# Patient Record
Sex: MALE | Race: WHITE | NOT HISPANIC OR LATINO | Employment: FULL TIME | URBAN - METROPOLITAN AREA
[De-identification: names, ages, dates, MRNs, and addresses within clinical notes are randomized per-mention and may not be internally consistent; named-entity substitution may affect disease eponyms.]

---

## 2023-12-20 ENCOUNTER — OFFICE VISIT (OUTPATIENT)
Dept: ORTHOPEDIC SURGERY | Facility: CLINIC | Age: 33
End: 2023-12-20
Payer: COMMERCIAL

## 2023-12-20 VITALS — BODY MASS INDEX: 26.38 KG/M2 | HEIGHT: 73 IN

## 2023-12-20 DIAGNOSIS — S82.839A NONDISPLACED FRACTURE OF PROXIMAL END OF FIBULA: Primary | ICD-10-CM

## 2023-12-20 DIAGNOSIS — S93.402A MILD ANKLE SPRAIN, LEFT, INITIAL ENCOUNTER: ICD-10-CM

## 2023-12-20 PROCEDURE — 99203 OFFICE O/P NEW LOW 30 MIN: CPT

## 2023-12-20 PROCEDURE — 27780 TREATMENT OF FIBULA FRACTURE: CPT

## 2023-12-20 PROCEDURE — L4361 PNEUMA/VAC WALK BOOT PRE OTS: HCPCS

## 2023-12-20 RX ORDER — IBUPROFEN 800 MG/1
TABLET ORAL
COMMUNITY
Start: 2023-12-17 | End: 2024-01-04 | Stop reason: ALTCHOICE

## 2023-12-20 NOTE — PROGRESS NOTES
HPI  Yan Dove is a 33 y.o. male  in office today for   Chief Complaint   Patient presents with    Left Ankle - Edema, Pain     12/16/23 twisted his ankle and heard it pop   .  he did this four days ago playing softball.  Went to urgent care the next day, diagnosed with a proximal fibula fracture, arrived wearing a CAM boot, using crutches.  He states that the pain and swelling have improved over the last 2 days.  Not having any pain proximal in the area of the fracture, pain mainly lateral side of the ankle.  He has been icing, elevating, ibuprofen as needed.    Past Medical History: history of right ankle tendon injury    Medication  Current Outpatient Medications on File Prior to Visit   Medication Sig Dispense Refill    ibuprofen 800 mg tablet        No current facility-administered medications on file prior to visit.       Physical Exam  Constitutional: well developed, well nourished male in no acute distress  Psychiatric: normal mood, appropriate affect  Eyes: sclera anicteric  HENT: normocephalic/atraumatic  CV: regular rate and rhythm   Respiratory: non labored breathing  Integumentary: no rash  Neurological: moves all extremities    Left Ankle Exam     Tenderness   The patient is experiencing tenderness in the lateral malleolus, CF and ATF.   Swelling: moderate    Range of Motion   Dorsiflexion:  abnormal   Plantar flexion:  abnormal   Eversion:  abnormal   Inversion:  abnormal     Tests   Anterior drawer: negative  Varus tilt: negative    Other   Erythema: absent  Scars: absent  Sensation: normal    Comments:  Ecchymosis diffuse about the ankle and foot into toes.              Imaging/Lab:  X-rays were taken at outside provider which were reviewed by myself via disc and report provided and read by radiology and show soft tissue swelling at the ankle, no fracture at the ankle.  Nondisplaced proximal fibula fracture.      Assessment  Assessment: Left ankle sprain, left proximal fibula  fracture    Plan  Plan:  History, physical exam, and imaging were reviewed with patient. Replaced CAM boot as the one he was given at urgent care is too small with toes hanging over the edge.  Boot should be worn full time other than for hygiene.  He should be nonweightbearing as he continues to have pain in the ankle.  He has crutches.  He should continue with RICE and antiinflammatory medication.  Follow Up: 2 weeks to reassess the ankle.  I do not need new x-rays at that time, will get additional imaging of the fracture at a later time.    All questions were answered for the patient prior to end of exam and patient addressed their understanding.    Susana Cross PA-C  12/20/23

## 2024-01-04 ENCOUNTER — OFFICE VISIT (OUTPATIENT)
Dept: ORTHOPEDIC SURGERY | Facility: CLINIC | Age: 34
End: 2024-01-04
Payer: COMMERCIAL

## 2024-01-04 DIAGNOSIS — S93.402A MILD ANKLE SPRAIN, LEFT, INITIAL ENCOUNTER: Primary | ICD-10-CM

## 2024-01-04 DIAGNOSIS — S82.839A NONDISPLACED FRACTURE OF PROXIMAL END OF FIBULA: ICD-10-CM

## 2024-01-04 PROCEDURE — 99024 POSTOP FOLLOW-UP VISIT: CPT

## 2024-01-04 RX ORDER — IBUPROFEN 800 MG/1
800 TABLET ORAL EVERY 8 HOURS PRN
Qty: 90 TABLET | Refills: 0 | Status: SHIPPED | OUTPATIENT
Start: 2024-01-04 | End: 2024-02-03

## 2024-01-04 NOTE — PROGRESS NOTES
WARD  Yan Dove is a 33 y.o. male in office today for follow up of side: left ankle sprain and left proximal fibula fracture.  he arrived without the boot as he states it was causing him to trip.  The pain in the ankle is much improved, only really having pain with going down stairs.  The proximal fibula is still without pain though did notice mile pain yesterday when he slipped on some ice.      Physical Exam  Constitutional: well developed, well nourished male in no acute distress  Psychiatric: normal mood, appropriate affect  Eyes: sclera anicteric  HENT: normocephalic/atraumatic  CV: regular rate and rhythm   Respiratory: non labored breathing  Integumentary: no rash  Neurological: moves all extremities    Left Ankle Exam     Tenderness   The patient is experiencing tenderness in the ATF.   Swelling: mild    Range of Motion   Dorsiflexion:  25   Plantar flexion:  40   Eversion:  normal   Inversion:  normal     Tests   Anterior drawer: negative  Varus tilt: negative    Other   Erythema: absent  Scars: absent  Sensation: normal            Imaging/Lab:  No new imaging today    Assessment  Assessment: Left ankle sprain, left proximal fibula fracture    Plan  Plan:  History, physical exam, and imaging were reviewed with patient. Orders entered for PT to help with the ankle sprain.  Would prefer that he continue in the boot for the fracture, but discussed continuing with limiting activity, no high impact activity while the fracture heals.  Prescription sent to pharmacy of ibuprofen.  Follow Up: 4 weeks with new tib/fib x-ray for proximal fibula fracture    All questions were answered for the patient prior to end of exam and patient addressed their understanding.    Susana Cross PA-C  01/04/24

## 2024-01-29 DIAGNOSIS — S82.839A NONDISPLACED FRACTURE OF PROXIMAL END OF FIBULA: ICD-10-CM

## 2024-01-29 DIAGNOSIS — S93.402A MILD ANKLE SPRAIN, LEFT, INITIAL ENCOUNTER: Primary | ICD-10-CM
